# Patient Record
Sex: FEMALE | Race: WHITE | URBAN - METROPOLITAN AREA
[De-identification: names, ages, dates, MRNs, and addresses within clinical notes are randomized per-mention and may not be internally consistent; named-entity substitution may affect disease eponyms.]

---

## 2017-08-08 ENCOUNTER — OFFICE VISIT (OUTPATIENT)
Dept: FAMILY MEDICINE | Facility: CLINIC | Age: 67
End: 2017-08-08
Payer: MEDICARE

## 2017-08-08 VITALS
BODY MASS INDEX: 26.91 KG/M2 | HEIGHT: 64 IN | TEMPERATURE: 97.3 F | SYSTOLIC BLOOD PRESSURE: 116 MMHG | WEIGHT: 157.6 LBS | OXYGEN SATURATION: 99 % | DIASTOLIC BLOOD PRESSURE: 64 MMHG | HEART RATE: 72 BPM

## 2017-08-08 DIAGNOSIS — I10 HTN, GOAL BELOW 150/90: ICD-10-CM

## 2017-08-08 DIAGNOSIS — R10.13 ABDOMINAL PAIN, EPIGASTRIC: Primary | ICD-10-CM

## 2017-08-08 DIAGNOSIS — Z11.59 NEED FOR HEPATITIS C SCREENING TEST: ICD-10-CM

## 2017-08-08 DIAGNOSIS — R10.13 DYSPEPSIA: ICD-10-CM

## 2017-08-08 LAB
ALBUMIN SERPL-MCNC: 4.1 G/DL (ref 3.4–5)
ALP SERPL-CCNC: 72 U/L (ref 40–150)
ALT SERPL W P-5'-P-CCNC: 23 U/L (ref 0–50)
AMYLASE SERPL-CCNC: 39 U/L (ref 30–110)
ANION GAP SERPL CALCULATED.3IONS-SCNC: 10 MMOL/L (ref 3–14)
AST SERPL W P-5'-P-CCNC: 17 U/L (ref 0–45)
BASOPHILS # BLD AUTO: 0 10E9/L (ref 0–0.2)
BASOPHILS NFR BLD AUTO: 0.3 %
BILIRUB SERPL-MCNC: 0.4 MG/DL (ref 0.2–1.3)
BUN SERPL-MCNC: 20 MG/DL (ref 7–30)
CALCIUM SERPL-MCNC: 9.6 MG/DL (ref 8.5–10.1)
CHLORIDE SERPL-SCNC: 103 MMOL/L (ref 94–109)
CO2 SERPL-SCNC: 25 MMOL/L (ref 20–32)
CREAT SERPL-MCNC: 0.79 MG/DL (ref 0.52–1.04)
DIFFERENTIAL METHOD BLD: NORMAL
EOSINOPHIL # BLD AUTO: 0.2 10E9/L (ref 0–0.7)
EOSINOPHIL NFR BLD AUTO: 2.2 %
ERYTHROCYTE [DISTWIDTH] IN BLOOD BY AUTOMATED COUNT: 13.8 % (ref 10–15)
GFR SERPL CREATININE-BSD FRML MDRD: 72 ML/MIN/1.7M2
GLUCOSE SERPL-MCNC: 87 MG/DL (ref 70–99)
HCT VFR BLD AUTO: 39.4 % (ref 35–47)
HGB BLD-MCNC: 13.1 G/DL (ref 11.7–15.7)
LIPASE SERPL-CCNC: 244 U/L (ref 73–393)
LYMPHOCYTES # BLD AUTO: 2.5 10E9/L (ref 0.8–5.3)
LYMPHOCYTES NFR BLD AUTO: 33.3 %
MCH RBC QN AUTO: 30.5 PG (ref 26.5–33)
MCHC RBC AUTO-ENTMCNC: 33.2 G/DL (ref 31.5–36.5)
MCV RBC AUTO: 92 FL (ref 78–100)
MONOCYTES # BLD AUTO: 0.9 10E9/L (ref 0–1.3)
MONOCYTES NFR BLD AUTO: 12.4 %
NEUTROPHILS # BLD AUTO: 3.9 10E9/L (ref 1.6–8.3)
NEUTROPHILS NFR BLD AUTO: 51.8 %
PLATELET # BLD AUTO: 260 10E9/L (ref 150–450)
POTASSIUM SERPL-SCNC: 4.1 MMOL/L (ref 3.4–5.3)
PROT SERPL-MCNC: 7.8 G/DL (ref 6.8–8.8)
RBC # BLD AUTO: 4.3 10E12/L (ref 3.8–5.2)
SODIUM SERPL-SCNC: 138 MMOL/L (ref 133–144)
WBC # BLD AUTO: 7.6 10E9/L (ref 4–11)

## 2017-08-08 PROCEDURE — 85025 COMPLETE CBC W/AUTO DIFF WBC: CPT | Performed by: PREVENTIVE MEDICINE

## 2017-08-08 PROCEDURE — 83690 ASSAY OF LIPASE: CPT | Performed by: PREVENTIVE MEDICINE

## 2017-08-08 PROCEDURE — 82150 ASSAY OF AMYLASE: CPT | Performed by: PREVENTIVE MEDICINE

## 2017-08-08 PROCEDURE — 80053 COMPREHEN METABOLIC PANEL: CPT | Performed by: PREVENTIVE MEDICINE

## 2017-08-08 PROCEDURE — 36415 COLL VENOUS BLD VENIPUNCTURE: CPT | Performed by: PREVENTIVE MEDICINE

## 2017-08-08 PROCEDURE — 99214 OFFICE O/P EST MOD 30 MIN: CPT | Performed by: PREVENTIVE MEDICINE

## 2017-08-08 RX ORDER — LISINOPRIL 5 MG/1
TABLET ORAL
COMMUNITY
Start: 2017-07-27

## 2017-08-08 RX ORDER — LINACLOTIDE 145 UG/1
CAPSULE, GELATIN COATED ORAL
COMMUNITY
Start: 2017-07-27

## 2017-08-08 RX ORDER — ESTRADIOL 10 UG/1
TABLET VAGINAL
COMMUNITY
Start: 2017-07-27

## 2017-08-08 NOTE — PATIENT INSTRUCTIONS
Based on your medical history and these are the current health maintenance or preventive care services that you are due for (some may have been done at this visit)  Health Maintenance Due   Topic Date Due     TETANUS IMMUNIZATION (SYSTEM ASSIGNED)  07/28/1966     HEPATITIS C SCREENING  07/28/1966     LIPID SCREEN Q5 YR FEMALE (SYSTEM ASSIGNED)  07/28/1993     MAMMO SCREEN Q2 YR (SYSTEM ASSIGNED)  07/28/1998     COLON CANCER SCREEN (SYSTEM ASSIGNED)  07/28/1998     ADVANCE DIRECTIVE PLANNING Q5 YRS  07/28/2003     FALL RISK ASSESSMENT  07/28/2013     DEXA SCAN SCREENING (SYSTEM ASSIGNED)  07/28/2013     PNEUMOCOCCAL (1 of 2 - PCV13) 07/28/2013         At [facility], we strive to deliver an exceptional experience to you, every time we see you.    If you receive a survey in the mail, please send us back your thoughts. We really do value your feedback.    Your care team's suggested websites for health information:  Www.MiserWare.Ganjiwang : Up to date and easily searchable information on multiple topics.  Www.medlineplus.gov : medication info, interactive tutorials, watch real surgeries online  Www.familydoctor.org : good info from the Academy of Family Physicians  Www.cdc.gov : public health info, travel advisories, epidemics (H1N1)  Www.aap.org : children's health info, normal development, vaccinations  Www.health.Counts include 234 beds at the [facility].[place].us : [place] dept of health, public health issues in [place], N1N1    How to contact your care team:   Team Karyn/Jarad [phone]         Pharmacy [phone]    Dr. Durbin, Vivien Flores PA-C, Dr. Calderon, Lakshmi Oscar APRN CNP, Estefania Joy PA-C, Dr. Barrera, and Sylvia Carter APRN CNP    Team RNs: Emely & Diana      Clinic hours  M-Th 7 am-7 pm   Fri 7 am-5 pm.   Urgent care M-F 11 am-9 pm,   Sat/Sun 9 am-5 pm.  Pharmacy M-Th 8 am-8 pm Fri 8 am-6 pm  Sat/Sun 9 am-5 pm.     All password changes, disabled accounts, or ID changes in MyChart/MyHealth will be done by our  Access Services Department.    If you need help with your account or password, call: [phone]. Clinic staff no longer has the ability to change passwords.

## 2017-08-08 NOTE — Clinical Note
Mammogram normal with PCP in [place] 11/2016.  Colonoscopy normal 9 years ago at [facility]. Jessica Calderon MD MPH

## 2017-08-08 NOTE — MR AVS SNAPSHOT
After Visit Summary   8/8/2017    Maude Santiago    MRN: [id number]           Patient Information     Date Of Birth          1950        Visit Information        Provider Department      8/8/2017 1:20 PM Jessica Calderon MD [facility]        Today's Diagnoses     Abdominal pain, epigastric    -  1    Dyspepsia          Care Instructions    Based on your medical history and these are the current health maintenance or preventive care services that you are due for (some may have been done at this visit)  Health Maintenance Due   Topic Date Due     TETANUS IMMUNIZATION (SYSTEM ASSIGNED)  07/28/1966     HEPATITIS C SCREENING  07/28/1966     LIPID SCREEN Q5 YR FEMALE (SYSTEM ASSIGNED)  07/28/1993     MAMMO SCREEN Q2 YR (SYSTEM ASSIGNED)  07/28/1998     COLON CANCER SCREEN (SYSTEM ASSIGNED)  07/28/1998     ADVANCE DIRECTIVE PLANNING Q5 YRS  07/28/2003     FALL RISK ASSESSMENT  07/28/2013     DEXA SCAN SCREENING (SYSTEM ASSIGNED)  07/28/2013     PNEUMOCOCCAL (1 of 2 - PCV13) 07/28/2013         At [facility], we strive to deliver an exceptional experience to you, every time we see you.    If you receive a survey in the mail, please send us back your thoughts. We really do value your feedback.    Your care team's suggested websites for health information:  Www.Loxley.org : Up to date and easily searchable information on multiple topics.  Www.medlineplus.gov : medication info, interactive tutorials, watch real surgeries online  Www.familydoctor.org : good info from the Academy of Family Physicians  Www.cdc.gov : public health info, travel advisories, epidemics (H1N1)  Www.aap.org : children's health info, normal development, vaccinations  Www.health.state.[place].us : [place] dept of health, public health issues in [place], N1N1    How to contact your care team:   Team Karyn/Spirit [phone]         Pharmacy [phone]    Dr. Durbin, Vivien Flores PA-C, Dr. Calderon,  Lakshmi MURRELL CNP, Estefania Joy PA-C, Dr. Barrera, and HANDY Guajardo CNP    Team RNs: Emely & Diana      Clinic hours  M-Th 7 am-7 pm   Fri 7 am-5 pm.   Urgent care M-F 11 am-9 pm,   Sat/Sun 9 am-5 pm.  Pharmacy M-Th 8 am-8 pm Fri 8 am-6 pm  Sat/Sun 9 am-5 pm.     All password changes, disabled accounts, or ID changes in Reading Room/MyHealth will be done by our Access Services Department.    If you need help with your account or password, call: [phone]. Clinic staff no longer has the ability to change passwords.             Follow-ups after your visit        Your next 10 appointments already scheduled     Aug 14, 2017  2:00 PM CDT   Return Visit with HANDY Albright CNP   [facility]    [address] [place] [id number] [phone]              Future tests that were ordered for you today     Open Future Orders        Priority Expected Expires Ordered    US Abdomen Limited Routine  8/8/2018 8/8/2017    H Pylori antigen, stool Routine  9/7/2017 8/8/2017            Who to contact     If you have questions or need follow up information about today's clinic visit or your schedule please contact [facility] directly at [phone].  Normal or non-critical lab and imaging results will be communicated to you by MyChart, letter or phone within 4 business days after the clinic has received the results. If you do not hear from us within 7 days, please contact the clinic through MyChart or phone. If you have a critical or abnormal lab result, we will notify you by phone as soon as possible.  Submit refill requests through Reading Room or call your pharmacy and they will forward the refill request to us. Please allow 3 business days for your refill to be completed.          Additional Information About Your Visit        Silverlink Communicationshart Information     Reading Room lets you send messages to your doctor, view your test results, renew  "your prescriptions, schedule appointments and more. To sign up, go to [email or web address] . Click on \"Log in\" on the left side of the screen, which will take you to the Welcome page. Then click on \"Sign up Now\" on the right side of the page.     You will be asked to enter the access code listed below, as well as some personal information. Please follow the directions to create your username and password.     Your access code is: [email or web address]  Expires: 2017  1:56 PM     Your access code will  in 90 days. If you need help or a new code, please call your [facility] clinic or [phone].        Care EveryWhere ID     This is your Care EveryWhere ID. This could be used by other organizations to access your [facility] medical records  [id number]        Your Vitals Were     Pulse Temperature Height Pulse Oximetry BMI (Body Mass Index)       72 97.3  F (36.3  C) (Oral) 5' 3.98\" (1.625 m) 99% 27.07 kg/m2        Blood Pressure from Last 3 Encounters:   17 116/64   14 138/64    Weight from Last 3 Encounters:   17 157 lb 9.6 oz (71.5 kg)   14 157 lb (71.2 kg)              We Performed the Following     Amylase     CBC with platelets differential     Comprehensive metabolic panel     Lipase          Today's Medication Changes          These changes are accurate as of: 17  1:56 PM.  If you have any questions, ask your nurse or doctor.               Start taking these medicines.        Dose/Directions    omeprazole 20 MG CR capsule   Commonly known as:  priLOSEC   Used for:  Abdominal pain, epigastric, Dyspepsia   Started by:  Jessica Calderon MD        Dose:  20 mg   Take 1 capsule (20 mg) by mouth daily   Quantity:  30 capsule   Refills:  1       ranitidine 300 MG tablet   Commonly known as:  ZANTAC   Used for:  Dyspepsia, Abdominal pain, epigastric   Started by:  Jessica Calderon MD        Dose:  300 mg   Take 1 tablet (300 mg) by mouth At Bedtime   Quantity:  30 tablet   Refills:  1    "         Where to get your medicines      These [place] [facility] [place] [address]  [address], [place] [address]     Phone:  [phone]     omeprazole 20 MG CR capsule    ranitidine 300 MG tablet                Primary Care Provider    [facility]       No address on file        Equal Access to Services     BLAKE BOSS : Hadii aad ku hadasho Soomaali, waaxda luqadaha, qaybta kaalmada adeegyada, waxay idiin hayaan adeeg kharash laamador . So [facility] [phone].    ATENCIÓN: Si habla español, tiene a juarez disposición servicios gratuitos de asistencia lingüística. Llame al [phone].    We comply with applicable federal civil rights laws and [place] laws. We do not discriminate on the basis of race, color, national origin, age, disability sex, sexual orientation or gender identity.            Thank you!     Thank you for choosing [facility]  for your care. Our goal is always to provide you with excellent care. Hearing back from our patients is one way we can continue to improve our services. Please take a few minutes to complete the written survey that you may receive in the mail after your visit with us. Thank you!             Your Updated Medication List - Protect others around you: Learn how to safely use, store and throw away your medicines at www.disposemymeds.org.          This list is accurate as of: 8/8/17  1:56 PM.  Always use your most recent med list.                   Brand Name Dispense Instructions for use Diagnosis    CRESTOR PO      Take 20 mg by mouth daily        ESCITALOPRAM OXALATE PO      Take 10 mg by mouth daily        ESTAZOLAM PO      Take 2 mg by mouth At Bedtime        HYDROCHLOROTHIAZIDE PO      Take 25 mg by mouth daily        LINZESS 145 MCG capsule   Generic drug:  linaclotide           lisinopril 5 MG tablet    PRINIVIL/ZESTRIL          metroNIDAZOLE 0.75 % cream     METROCREAM     Apply 0.75 applicators topically 2 times daily        omeprazole 20 MG CR capsule    priLOSEC    30 capsule    Take 1 capsule (20 mg) by mouth daily    Abdominal pain, epigastric, Dyspepsia       RAMIPRIL PO      Take 5 mg by mouth daily        ranitidine 300 MG tablet    ZANTAC    30 tablet    Take 1 tablet (300 mg) by mouth At Bedtime    Dyspepsia, Abdominal pain, epigastric       YUVAFEM 10 MCG Tabs vaginal tablet   Generic drug:  estradiol

## 2017-08-08 NOTE — NURSING NOTE
"Chief Complaint   Patient presents with     Abdominal Pain       Initial /64 (BP Location: Right arm, Patient Position: Sitting, Cuff Size: Adult Regular)  Pulse 72  Temp 97.3  F (36.3  C) (Oral)  Ht 5' 3.98\" (1.625 m)  Wt 157 lb 9.6 oz (71.5 kg)  SpO2 99%  BMI 27.07 kg/m2 Estimated body mass index is 27.07 kg/(m^2) as calculated from the following:    Height as of this encounter: 5' 3.98\" (1.625 m).    Weight as of this encounter: 157 lb 9.6 oz (71.5 kg).  Medication Reconciliation: complete     Panel Management: Mammo completed in 11/2016 at [facility] [place], results were normal. Will send to abstraction team.      "

## 2017-08-08 NOTE — PROGRESS NOTES
SUBJECTIVE:                                                    Maude Santiago is a 67 year old female who presents to clinic today for the following health issues:      ABDOMINAL   PAIN     Onset: Three weeks     Description:   Character: Stabbing in the rib area, Burning and Cramping  Location: epigastric region  Radiation: Neck     Intensity: moderate    Progression of Symptoms:  improving    Accompanying Signs & Symptoms:  Fever/Chills?: YES- fever and chills   Gas/Bloating: no   Nausea: no   Vomitting: no   Diarrhea?: no   Constipation:no   Dysuria or Hematuria: no   No belching or burping  No rectal bleeding or melena  History of chronic constipation for which she takes Miralax almost daily, has also been prescribed Linzess    History:   Trauma: no   Previous similar pain: no    Previous tests done: none    Precipitating factors:   Does the pain change with:     Food: no      BM: no     Urination: no     Alleviating factors:      Therapies Tried and outcome: Tylenol, ibuprofen, zantac    LMP:  not applicable       Normally dandre in [place], has PCP there. Has been helping her step son with packing, asim has been lifting some boxes. No trauma or falls, no rash, no bruising.   Pain unchanged in severity over the last few weeks  Felt feverish   No dysuria, no hematuria  Pain is more with activity  She feels it is also under the ribs, worse with coughing, sneezing and laughing.     Hypertension Follow-up      Outpatient blood pressures are being checked at home.  Results are less than 140/90.    Low Salt Diet: low salt          Problem list and histories reviewed & adjusted, as indicated.  Additional history: as documented    Patient Active Problem List   Diagnosis     CARDIOVASCULAR SCREENING; LDL GOAL LESS THAN 160     HTN, goal below 150/90     History reviewed. No pertinent surgical history.    Social History   Substance Use Topics     Smoking status: Never Smoker     Smokeless tobacco: Never Used     Alcohol use  "No     History reviewed. No pertinent family history.      Current Outpatient Prescriptions   Medication Sig Dispense Refill     LINZESS 145 MCG capsule        lisinopril (PRINIVIL/ZESTRIL) 5 MG tablet        YUVAFEM 10 MCG TABS vaginal tablet        ranitidine (ZANTAC) 300 MG tablet Take 1 tablet (300 mg) by mouth At Bedtime 30 tablet 1     omeprazole (PRILOSEC) 20 MG CR capsule Take 1 capsule (20 mg) by mouth daily 30 capsule 1     Rosuvastatin Calcium (CRESTOR PO) Take 20 mg by mouth daily       RAMIPRIL PO Take 5 mg by mouth daily       metroNIDAZOLE (METROCREAM) 0.75 % cream Apply 0.75 applicators topically 2 times daily       ESCITALOPRAM OXALATE PO Take 10 mg by mouth daily       HYDROCHLOROTHIAZIDE PO Take 25 mg by mouth daily       ESTAZOLAM PO Take 2 mg by mouth At Bedtime       No Known Allergies  BP Readings from Last 3 Encounters:   08/08/17 116/64   09/03/14 138/64    Wt Readings from Last 3 Encounters:   08/08/17 157 lb 9.6 oz (71.5 kg)   09/03/14 157 lb (71.2 kg)                Reviewed and updated as needed this visit by clinical staff       Reviewed and updated as needed this visit by Provider         ROS:  Constitutional, neuro, ENT, endocrine, pulmonary, cardiac, gastrointestinal, genitourinary, musculoskeletal, integument and psychiatric systems are negative, except as otherwise noted.      OBJECTIVE:                                                    /64 (BP Location: Right arm, Patient Position: Sitting, Cuff Size: Adult Regular)  Pulse 72  Temp 97.3  F (36.3  C) (Oral)  Ht 5' 3.98\" (1.625 m)  Wt 157 lb 9.6 oz (71.5 kg)  SpO2 99%  BMI 27.07 kg/m2  Body mass index is 27.07 kg/(m^2).  GENERAL APPEARANCE: healthy, alert and no distress  EYES: Eyes grossly normal to inspection and conjunctivae and sclerae normal  NECK: no adenopathy and no asymmetry, masses, or scars  RESP: lungs clear to auscultation - no rales, rhonchi or wheezes  CV: regular rates and rhythm, normal S1 S2, no S3 or " S4 and no murmur, click or rub  ABDOMEN: non distended, no rebound or guarding, no masses, mild tenderness in RUQ and epigastric region   MS: extremities normal- no gross deformities noted  SKIN: no suspicious lesions or rashes  NEURO: Normal strength and tone, mentation intact and speech normal  PSYCH: mentation appears normal and affect normal/bright    Diagnostic test results:  Diagnostic Test Results:  Results for orders placed or performed in visit on 08/08/17 (from the past 24 hour(s))   CBC with platelets differential   Result Value Ref Range    WBC 7.6 4.0 - 11.0 10e9/L    RBC Count 4.30 3.8 - 5.2 10e12/L    Hemoglobin 13.1 11.7 - 15.7 g/dL    Hematocrit 39.4 35.0 - 47.0 %    MCV 92 78 - 100 fl    MCH 30.5 26.5 - 33.0 pg    MCHC 33.2 31.5 - 36.5 g/dL    RDW 13.8 10.0 - 15.0 %    Platelet Count 260 150 - 450 10e9/L    Diff Method Automated Method     % Neutrophils 51.8 %    % Lymphocytes 33.3 %    % Monocytes 12.4 %    % Eosinophils 2.2 %    % Basophils 0.3 %    Absolute Neutrophil 3.9 1.6 - 8.3 10e9/L    Absolute Lymphocytes 2.5 0.8 - 5.3 10e9/L    Absolute Monocytes 0.9 0.0 - 1.3 10e9/L    Absolute Eosinophils 0.2 0.0 - 0.7 10e9/L    Absolute Basophils 0.0 0.0 - 0.2 10e9/L          ASSESSMENT/PLAN:                                                    1. Abdominal pain, epigastric  -Await labs  -Can wait to schedule ultrasound 5-7 days if pain is not increasing   - ranitidine (ZANTAC) 300 MG tablet; Take 1 tablet (300 mg) by mouth At Bedtime  Dispense: 30 tablet; Refill: 1  - omeprazole (PRILOSEC) 20 MG CR capsule; Take 1 capsule (20 mg) by mouth daily  Dispense: 30 capsule; Refill: 1  - Amylase  - Lipase  - Comprehensive metabolic panel  - CBC with platelets differential  - H Pylori antigen, stool; Future  - US Abdomen Limited; Future  --ER precautions reviewed in detail. If increased abdominal pain, fever over 101 F, emesis, rectal bleeding, melena, then needs to be seen in ER, patient expressed  comprehension of this.       2. Dyspepsia  - ranitidine (ZANTAC) 300 MG tablet; Take 1 tablet (300 mg) by mouth At Bedtime  Dispense: 30 tablet; Refill: 1  - omeprazole (PRILOSEC) 20 MG CR capsule; Take 1 capsule (20 mg) by mouth daily  Dispense: 30 capsule; Refill: 1  - H Pylori antigen, stool; Future    3. Need for hepatitis C screening test  - **Hepatitis C Screen Reflex to RNA FUTURE anytime; Future    4. HTN, goal below 150/90  -Readings are at goal  -Follow up with PCP as scheduled in [place]     I ended our visit today by discussing the patient's diagnoses and recommended treatment. Please refer to today's diagnoses and orders for further details. I briefly discussed the pathophysiology of these conditions and outlined their expected course. I discussed the warning symptoms and signs that indicate an atypical course that would need urgent or emergent care. I also discussed self care strategies for symptom relief.  Patient voiced complete understanding of plan of care and was in full agreement to proceed. After visit summary discussed and handed to patient.    Common side effects of medications prescribed at this visit were discussed with the patient. Severe side effects, including current applicable black box warnings, were discussed.     Follow up with Provider - Will contact with labs when available      Jessica Calderon MD MPH    [facility]

## 2017-08-09 ENCOUNTER — RADIANT APPOINTMENT (OUTPATIENT)
Dept: ULTRASOUND IMAGING | Facility: CLINIC | Age: 67
End: 2017-08-09
Payer: MEDICARE

## 2017-08-09 DIAGNOSIS — R10.13 ABDOMINAL PAIN, EPIGASTRIC: ICD-10-CM

## 2017-08-09 PROCEDURE — 76705 ECHO EXAM OF ABDOMEN: CPT

## 2017-08-09 NOTE — LETTER
[address] [address]  [phone]                                                                                                             August 9, 2017      Maude Santiago  [facility] [facility] [address] [facility]  [place] [address]            Dear Maude Santiago,    Ultrasound of the upper part of the abdomen did not show any abnormalities. No lesions in the liver or gallbladder seen. Plan of care and follow up as discussed in clinic. Please let me know if you have any questions and thank you for choosing [facility].    Regards,    Jessica Calderon MD MPH/ak        Results for orders placed or performed in visit on 08/09/17   US Abdomen Limited    Narrative    Right upper quadrant ultrasound    Comparisons: None    HISTORY:    Epigastric pain    FINDINGS:    The liver measures a craniocaudal dimension of 15.2 cm.  No focal liver lesion. Liver echogenicity is normal.  The gallbladder  is normal. There is no gallbladder wall thickening or pericholecystic  fluid. No sonographic Hutson's sign was elicited.  The diameter of the  common bile duct measures 5mm. The pancreas is partially obscured but  otherwise appears unremarkable. The aorta and IVC are visualized. The  right kidney measures 9.8 cm long. No solid renal mass, stone or  hydronephrosis.      Impression    IMPRESSION:    Normal right upper quadrant ultrasound.    SANDI BRYSON MD

## 2017-08-09 NOTE — PROGRESS NOTES
Please CALL patient: (home address is in [place] and she is currently traveling)    Dear Maude Santiago,    Labs did not show any abnormalities.  Basic blood count did not show anemia or infection in the blood. Electrolytes, glucose, pancreas, kidney and liver function tests are normal.     Regards,    Jessica Calderon MD MPH

## 2017-08-09 NOTE — PROGRESS NOTES
Please send a letter:    Dear Maude Santiago,    Ultrasound of the upper part of the abdomen did not show any abnormalities. No lesions in the liver or gallbladder seen. Plan of care and follow up as discussed in clinic. Please let me know if you have any questions and thank you for choosing [facility].    Regards,    Jessica Calderon MD MPH

## 2017-08-10 DIAGNOSIS — R10.13 ABDOMINAL PAIN, EPIGASTRIC: ICD-10-CM

## 2017-08-10 DIAGNOSIS — R10.13 DYSPEPSIA: ICD-10-CM

## 2017-08-10 PROCEDURE — 87338 HPYLORI STOOL AG IA: CPT | Performed by: PREVENTIVE MEDICINE

## 2017-08-10 NOTE — LETTER
Maude Santiago  [address]  [place] [address]        August 15, 2017          Dear Ms.Santiago,    We are writing to inform you of your test results.    Stool test did not show infection with bacteria H Pylori. Plan of care and follow up as discussed in clinic. Please let me know if you have any questions and thank you for choosing [facility].     Resulted Orders   H Pylori antigen, stool   Result Value Ref Range    Specimen Description Feces     H Pylori Antigen       Negative for Helicobacter pylori antigen by enzyme immunoassay. A negative   result indicates the absence of H. pylori antigen or that the level of antigen   is below the level of detection.      Micro Report Status FINAL 08/11/2017        If you have any questions or concerns, please call the clinic at the number listed above.       Sincerely,    Dr. Calderon

## 2017-08-11 LAB
H PYLORI AG STL QL IA: NORMAL
MICRO REPORT STATUS: NORMAL
SPECIMEN SOURCE: NORMAL

## 2017-08-15 ENCOUNTER — RADIANT APPOINTMENT (OUTPATIENT)
Dept: GENERAL RADIOLOGY | Facility: CLINIC | Age: 67
End: 2017-08-15
Payer: MEDICARE

## 2017-08-15 ENCOUNTER — OFFICE VISIT (OUTPATIENT)
Dept: FAMILY MEDICINE | Facility: CLINIC | Age: 67
End: 2017-08-15
Payer: MEDICARE

## 2017-08-15 VITALS
BODY MASS INDEX: 27.66 KG/M2 | WEIGHT: 161 LBS | TEMPERATURE: 97.9 F | SYSTOLIC BLOOD PRESSURE: 118 MMHG | OXYGEN SATURATION: 98 % | DIASTOLIC BLOOD PRESSURE: 60 MMHG | HEART RATE: 72 BPM

## 2017-08-15 DIAGNOSIS — I10 HTN, GOAL BELOW 150/90: Primary | ICD-10-CM

## 2017-08-15 DIAGNOSIS — R06.02 SOB (SHORTNESS OF BREATH): ICD-10-CM

## 2017-08-15 DIAGNOSIS — M79.10 MUSCLE PAIN: ICD-10-CM

## 2017-08-15 DIAGNOSIS — E78.5 HYPERLIPIDEMIA LDL GOAL <130: ICD-10-CM

## 2017-08-15 PROCEDURE — 99214 OFFICE O/P EST MOD 30 MIN: CPT | Performed by: PREVENTIVE MEDICINE

## 2017-08-15 PROCEDURE — 71020 XR CHEST 2 VW: CPT

## 2017-08-15 RX ORDER — CYCLOBENZAPRINE HCL 5 MG
5 TABLET ORAL
Qty: 30 TABLET | Refills: 0 | Status: SHIPPED | OUTPATIENT
Start: 2017-08-15

## 2017-08-15 NOTE — PROGRESS NOTES
Please send a letter:    Dear Maude Santiago,    Stool test did not show infection with bacteria H Pylori. Plan of care and follow up as discussed in clinic. Please let me know if you have any questions and thank you for choosing [facility].    Regards,    Jessica Calderon MD MPH

## 2017-08-15 NOTE — PROGRESS NOTES
SUBJECTIVE:                                                    Maude Santiago is a 67 year old female who presents to clinic today for the following health issues:      Abdominal Pain      Duration: 1 month     Description (location/character/radiation): rib cage and down the sides mid section and having shortness of breath        Associated flank pain: None    Intensity:  moderate    Accompanying signs and symptoms:        Fever/Chills: YES       Gas/Bloating: YES       Nausea/vomitting: no        Diarrhea: no        Dysuria or Hematuria: no     History (previous similar pain/trauma/previous testing): No    Precipitating or alleviating factors:       Pain worse with eating/BM/urination: Eating       Pain relieved by BM: no     Therapies tried and outcome: None Zantac     LMP:     Right nasal stuffiness, used Afrin and Sudafed. Feeling chest congested and a little short of breath. Symptoms now for 4 weeks. No fever. Feels feverish and has had chills. Is visiting from [place], staying with sister, who does have pets, has noticed increased nasal stuffiness with this, better with allergy medication     Also has had generalized muscle aching mainly waist up. Has tolerated statin without complications in the past.     Hyperlipidemia Follow-Up      Rate your low fat/cholesterol diet?: good    Taking statin?  Yes, possible muscle aches from statin    Other lipid medications/supplements?:  none    Hypertension Follow-up      Outpatient blood pressures are being checked at home.  Results are less than 140/90.    Low Salt Diet: low salt            Problem list and histories reviewed & adjusted, as indicated.  Additional history: as documented    Patient Active Problem List   Diagnosis     CARDIOVASCULAR SCREENING; LDL GOAL LESS THAN 160     HTN, goal below 150/90     Hyperlipidemia LDL goal <130     No past surgical history on file.    Social History   Substance Use Topics     Smoking status: Never Smoker     Smokeless tobacco:  Never Used     Alcohol use No     No family history on file.      Current Outpatient Prescriptions   Medication Sig Dispense Refill     cyclobenzaprine (FLEXERIL) 5 MG tablet Take 1 tablet (5 mg) by mouth nightly as needed for muscle spasms 30 tablet 0     LINZESS 145 MCG capsule        lisinopril (PRINIVIL/ZESTRIL) 5 MG tablet        YUVAFEM 10 MCG TABS vaginal tablet        ranitidine (ZANTAC) 300 MG tablet Take 1 tablet (300 mg) by mouth At Bedtime 30 tablet 1     omeprazole (PRILOSEC) 20 MG CR capsule Take 1 capsule (20 mg) by mouth daily 30 capsule 1     Rosuvastatin Calcium (CRESTOR PO) Take 20 mg by mouth daily       RAMIPRIL PO Take 5 mg by mouth daily       ESCITALOPRAM OXALATE PO Take 10 mg by mouth daily       HYDROCHLOROTHIAZIDE PO Take 25 mg by mouth daily       metroNIDAZOLE (METROCREAM) 0.75 % cream Apply 0.75 applicators topically 2 times daily       ESTAZOLAM PO Take 2 mg by mouth At Bedtime       No Known Allergies  BP Readings from Last 3 Encounters:   08/15/17 118/60   08/08/17 116/64   09/03/14 138/64    Wt Readings from Last 3 Encounters:   08/15/17 161 lb (73 kg)   08/08/17 157 lb 9.6 oz (71.5 kg)   09/03/14 157 lb (71.2 kg)                        Reviewed and updated as needed this visit by clinical staff     Reviewed and updated as needed this visit by Provider         ROS:  Constitutional, HEENT, cardiovascular, pulmonary, gi and gu systems are negative, except as otherwise noted.      OBJECTIVE:                                                    /60  Pulse 72  Temp 97.9  F (36.6  C) (Tympanic)  Wt 161 lb (73 kg)  SpO2 98%  Breastfeeding? No  BMI 27.66 kg/m2  Body mass index is 27.66 kg/(m^2).  GENERAL APPEARANCE: healthy, alert and no distress  EYES: Eyes grossly normal to inspection and conjunctivae and sclerae normal  HENT: nose and mouth without ulcers or lesions  NECK: no adenopathy, no asymmetry, masses, or scars and trachea midline and normal to palpation  RESP: lungs  clear to auscultation - no rales, rhonchi or wheezes  CV: regular rates and rhythm, normal S1 S2, no S3 or S4 and no murmur, click or rub  ABDOMEN: soft, non-tender and no rebound or guarding   MS: extremities normal- no gross deformities noted  SKIN: no suspicious lesions or rashes  NEURO: Normal strength and tone, mentation intact and speech normal  PSYCH: mentation appears normal and affect normal/bright    Diagnostic test results:  Diagnostic Test Results:  Results for orders placed or performed in visit on 08/10/17   H Pylori antigen, stool   Result Value Ref Range    Specimen Description Feces     H Pylori Antigen       Negative for Helicobacter pylori antigen by enzyme immunoassay. A negative   result indicates the absence of H. pylori antigen or that the level of antigen   is below the level of detection.      Micro Report Status FINAL 08/11/2017           CHEST TWO VIEWS   8/15/2017 3:49 PM     HISTORY:  Shortness of breath     COMPARISON:  None.     FINDINGS:  The heart size is normal. No mediastinal pathology is seen.  The lungs are clear. The pulmonary vasculature is normal. No  pneumothorax or pleural effusion is seen.          IMPRESSION:  Unremarkable chest.     ANNY PENDLETON MD    Right upper quadrant ultrasound     Comparisons: None     HISTORY:    Epigastric pain     FINDINGS:    The liver measures a craniocaudal dimension of 15.2 cm.  No focal liver lesion. Liver echogenicity is normal.  The gallbladder  is normal. There is no gallbladder wall thickening or pericholecystic  fluid. No sonographic Hutson's sign was elicited.  The diameter of the  common bile duct measures 5mm. The pancreas is partially obscured but  otherwise appears unremarkable. The aorta and IVC are visualized. The  right kidney measures 9.8 cm long. No solid renal mass, stone or  hydronephrosis.         IMPRESSION:    Normal right upper quadrant ultrasound.     SANDI BRYSON MD      ASSESSMENT/PLAN:                                                     1. HTN, goal below 150/90  -Recheck BP is normal  -Follow up with PCP in [place]  -Avoid over the counter decongestants as these can raise blood pressure     2. Hyperlipidemia LDL goal <130  -On 20 mg of Crestor  -Hold for 5 days and see if muscle aches improve     3. SOB (shortness of breath)  -Chest X ray is negative for infections   - XR Chest 2 Views; Future    4. Muscle pain  -CBC, CMP, Amylase and Lipase were normal  -Continue Zantac and Omeprazole as needed for dyspepsia  -Sedation side effect of muscle relaxer reviewed   - cyclobenzaprine (FLEXERIL) 5 MG tablet; Take 1 tablet (5 mg) by mouth nightly as needed for muscle spasms  Dispense: 30 tablet; Refill: 0    I ended our visit today by discussing the patient's diagnoses and recommended treatment. Please refer to today's diagnoses and orders for further details. I briefly discussed the pathophysiology of these conditions and outlined their expected course. I discussed the warning symptoms and signs that indicate an atypical course that would need urgent or emergent care. I also discussed self care strategies for symptom relief.  Patient voiced complete understanding of plan of care and was in full agreement to proceed. After visit summary discussed and handed to patient.    Common side effects of medications prescribed at this visit were discussed with the patient. Severe side effects, including current applicable black box warnings, were discussed.         Follow up with Provider - As needed      Jessica Calderon MD [facility]

## 2017-08-15 NOTE — PROGRESS NOTES
Please CALL patient: (lives in [place], visiting [place])    Dear Maude Santiago,    Chest X ray did not show any acute infections, pneumonias or fluid in the lungs.     Regards,    Jessica Calderon MD MPH

## 2017-08-15 NOTE — MR AVS SNAPSHOT
After Visit Summary   8/15/2017    Maude Santiago    MRN: [id number]           Patient Information     Date Of Birth          1950        Visit Information        Provider Department      8/15/2017 2:40 PM Jessica Calderon MD [facility]        Today's Diagnoses     HTN, goal below 150/90    -  1    Hyperlipidemia LDL goal <130        SOB (shortness of breath)        Muscle pain          Care Instructions    Based on your medical history and these are the current health maintenance or preventive care services that you are due for (some may have been done at this visit)  Health Maintenance Due   Topic Date Due     TETANUS IMMUNIZATION (SYSTEM ASSIGNED)  07/28/1966     HEPATITIS C SCREENING  07/28/1966     LIPID SCREEN Q5 YR FEMALE (SYSTEM ASSIGNED)  07/28/1993     ADVANCE DIRECTIVE PLANNING Q5 YRS  07/28/2003     DEXA SCAN SCREENING (SYSTEM ASSIGNED)  07/28/2013     PNEUMOCOCCAL (1 of 2 - PCV13) 07/28/2013         At [facility], we strive to deliver an exceptional experience to you, every time we see you.    If you receive a survey in the mail, please send us back your thoughts. We really do value your feedback.    Your care team's suggested websites for health information:  Www.[employer].org : Up to date and easily searchable information on multiple topics.  Www.medlineplus.gov : medication info, interactive tutorials, watch real surgeries online  Www.familydoctor.org : good info from the Academy of Family Physicians  Www.cdc.gov : public health info, travel advisories, epidemics (H1N1)  Www.aap.org : children's health info, normal development, vaccinations  Www.health.state.[place] : [place] dept of health, public health issues in [place], N1N1    How to contact your care team:   Team Karyn/Spirit [phone]         Pharmacy [phone]    Dr. Durbin, Vivien Flores PA-C, Lakshmi Hernández APRN CNP, Estefania Joy PA-C, Dr. Barrera, and Sylvia Carter,  "APRN CNP    Team RNs: Emely Sheffield Diana      Clinic hours  M-Th 7 am-7 pm   Fri 7 am-5 pm.   Urgent care M-F 11 am-9 pm,   Sat/Sun 9 am-5 pm.  Pharmacy M-Th 8 am-8 pm Fri 8 am-6 pm  Sat/Sun 9 am-5 pm.     All password changes, disabled accounts, or ID changes in Favbuy/MyHealth will be done by our Access Services Department.    If you need help with your account or password, call: [phone]. Clinic staff no longer has the ability to change passwords.             Follow-ups after your visit        Future tests that were ordered for you today     Open Future Orders        Priority Expected Expires Ordered    XR Chest 2 Views Routine 8/15/2017 8/15/2018 8/15/2017            Who to contact     If you have questions or need follow up information about today's clinic visit or your schedule please contact [facility] directly at [phone].  Normal or non-critical lab and imaging results will be communicated to you by Mydeohart, letter or phone within 4 business days after the clinic has received the results. If you do not hear from us within 7 days, please contact the clinic through Mydeohart or phone. If you have a critical or abnormal lab result, we will notify you by phone as soon as possible.  Submit refill requests through Favbuy or call your pharmacy and they will forward the refill request to us. Please allow 3 business days for your refill to be completed.          Additional Information About Your Visit        Favbuy Information     Favbuy lets you send messages to your doctor, view your test results, renew your prescriptions, schedule appointments and more. To sign up, go to [email or web address] . Click on \"Log in\" on the left side of the screen, which will take you to the Welcome page. Then click on \"Sign up Now\" on the right side of the page.     You will be asked to enter the access code listed below, as well as some personal information. Please follow the directions to create " your username and password.     Your access code is: [email or web address]  Expires: 2017  1:56 PM     Your access code will  in 90 days. If you need help or a new code, please call your [facility] clinic or [phone].        Care EveryWhere ID     This is your Care EveryWhere ID. This could be used by other organizations to access your [facility] medical records  [id number]        Your Vitals Were     Pulse Temperature Pulse Oximetry Breastfeeding? BMI (Body Mass Index)       72 97.9  F (36.6  C) (Tympanic) 98% No 27.66 kg/m2        Blood Pressure from Last 3 Encounters:   08/15/17 118/60   17 116/64   14 138/64    Weight from Last 3 Encounters:   08/15/17 161 lb (73 kg)   17 157 lb 9.6 oz (71.5 kg)   14 157 lb (71.2 kg)                 Today's Medication Changes          These changes are accurate as of: 8/15/17  3:38 PM.  If you have any questions, ask your nurse or doctor.               Start taking these medicines.        Dose/Directions    cyclobenzaprine 5 MG tablet   Commonly known as:  FLEXERIL   Used for:  Muscle pain   Started by:  Jessica Calderon MD        Dose:  5 mg   Take 1 tablet (5 mg) by mouth nightly as needed for muscle spasms   Quantity:  30 tablet   Refills:  0            Where to get your medicines      These medications were sent to [facility] - [place], [place] - [address]  [address], [facility] [address]     Phone:  [phone]     cyclobenzaprine 5 MG tablet                Primary Care Provider    [facility] [place]       No address on file        Equal Access to Services     BLAKE BOSS AH: Jovana Ramirez, wacandaceda lubuffyadaha, qaybta kaalmada kierra, mitchell watkins. So [facility] [phone].    ATENCIÓN: Si habla español, tiene a juarez disposición servicios gratuitos de asistencia lingüística. Llame al [phone].    We comply with applicable federal civil rights laws and  [place] laws. We do not discriminate on the basis of race, color, national origin, age, disability sex, sexual orientation or gender identity.            Thank you!     Thank you for choosing [facility]  for your care. Our goal is always to provide you with excellent care. Hearing back from our patients is one way we can continue to improve our services. Please take a few minutes to complete the written survey that you may receive in the mail after your visit with us. Thank you!             Your Updated Medication List - Protect others around you: Learn how to safely use, store and throw away your medicines at www.disposemymeds.org.          This list is accurate as of: 8/15/17  3:38 PM.  Always use your most recent med list.                   Brand Name Dispense Instructions for use Diagnosis    CRESTOR PO      Take 20 mg by mouth daily        cyclobenzaprine 5 MG tablet    FLEXERIL    30 tablet    Take 1 tablet (5 mg) by mouth nightly as needed for muscle spasms    Muscle pain       ESCITALOPRAM OXALATE PO      Take 10 mg by mouth daily        ESTAZOLAM PO      Take 2 mg by mouth At Bedtime        HYDROCHLOROTHIAZIDE PO      Take 25 mg by mouth daily        LINZESS 145 MCG capsule   Generic drug:  linaclotide           lisinopril 5 MG tablet    PRINIVIL/ZESTRIL          metroNIDAZOLE 0.75 % cream    METROCREAM     Apply 0.75 applicators topically 2 times daily        omeprazole 20 MG CR capsule    priLOSEC    30 capsule    Take 1 capsule (20 mg) by mouth daily    Abdominal pain, epigastric, Dyspepsia       RAMIPRIL PO      Take 5 mg by mouth daily        ranitidine 300 MG tablet    ZANTAC    30 tablet    Take 1 tablet (300 mg) by mouth At Bedtime    Dyspepsia, Abdominal pain, epigastric       YUVAFEM 10 MCG Tabs vaginal tablet   Generic drug:  estradiol

## 2017-08-16 ENCOUNTER — TELEPHONE (OUTPATIENT)
Dept: FAMILY MEDICINE | Facility: CLINIC | Age: 67
End: 2017-08-16

## 2017-08-16 NOTE — LETTER
August 21, 2017      Maude Santiago  [address]  [place] [address]            Dear Maude Santiago,     Chest X ray did not show any acute infections, pneumonias or fluid in the lungs.     Regards,     Jessica Calderon MD MPH/ak        Results for orders placed or performed in visit on 08/15/17   XR Chest 2 Views    Narrative    CHEST TWO VIEWS   8/15/2017 3:49 PM    HISTORY:  Shortness of breath    COMPARISON:  None.    FINDINGS:  The heart size is normal. No mediastinal pathology is seen.  The lungs are clear. The pulmonary vasculature is normal. No  pneumothorax or pleural effusion is seen.       Impression    IMPRESSION:  Unremarkable chest.    ANNY PENDLETON MD

## 2017-08-16 NOTE — TELEPHONE ENCOUNTER
Called again, given a busy signal- not even able to enter long distance code. Wrong number?disconnected?  Will try again later, if still getting same result, will ask permission from provider to mail letter.  Maura Ramirez MA

## 2017-08-16 NOTE — TELEPHONE ENCOUNTER
Called number listed in demographic, busy signal. Attempted a few times, still getting busy signal.   Will try again later.    Maura Ramirez MA

## 2017-08-17 NOTE — TELEPHONE ENCOUNTER
Unable to get ahold of pt, getting busy signal. Number on file needs to be updated.   Multiple attempts to get a hold of pt, routing to provider to advise.  Nithya Zafar MA

## 2018-02-12 ENCOUNTER — TELEPHONE (OUTPATIENT)
Dept: FAMILY MEDICINE | Facility: CLINIC | Age: 68
End: 2018-02-12

## 2018-02-12 NOTE — LETTER
February 12, 2018        Maude Santiago  [address] [place] [address]        Dear Maude Santiago,     At [facility] we care about your health and are committed to providing quality patient care. Which includes staying current on preventive cancer screenings.  You can increase your chances of finding and treating cancers through regular screenings.      Our records indicate you may be due for the following preventive screening(s):    Colonoscopy  Colonoscopy is recommended every ten years for everyone age 50 and older. Please take a moment to read over the enclosed information packet about colon cancer screening. We strongly urge our patient's to consider having a colonoscopy done, which is the best screening test available and only needs to be done every 10 years if normal. If you are unwilling or unable to have a colonoscopy then we recommend the annual stool testing for blood. This test is called a FIT test and it looks for blood in the stool.     To schedule an appointment or discuss this screening further, you may contact us by phone at the [facility] at [phone] or online through the patient portal/AIM @ [email or web address]    If you have had any of the screenings listed above at another facility, please call us so that we may update your chart.      Your partners in health,          Quality Committee at [facility]

## 2018-02-12 NOTE — TELEPHONE ENCOUNTER
Panel Management Review      BP Readings from Last 1 Encounters:   08/15/17 118/60    , No results found for: A1C, 8/16/2017  Last Office Visit with this department: 8/16/2017    Fail List measure: Colon cancer screening      Patient is due/failing the following:   COLONOSCOPY    Action needed:   Schedule colonoscopy    Type of outreach:    Sent letter.    Questions for provider review:    None                                                                                                                                    Ajith Arciniega      Chart routed to NA .

## 2018-05-29 ENCOUNTER — TELEPHONE (OUTPATIENT)
Dept: FAMILY MEDICINE | Facility: CLINIC | Age: 68
End: 2018-05-29

## 2018-05-29 NOTE — TELEPHONE ENCOUNTER
Panel Management Review      BP Readings from Last 1 Encounters:   08/15/17 118/60      Last Office Visit with this department: 2/12/2018    Fail List measure:       Patient is due/failing the following:   COLONOSCOPY    Action needed:   Patient needs to do Colonoscopy.    Type of outreach:    Sent letter.    Questions for provider review:    None                                                                                                                                    Pam Priest MA

## 2018-05-29 NOTE — LETTER
May 29, 2018      Maude Santiago  [address] [place] [address]      Dear Maude Santiago,      At [facility] we care about your health and are committed to providing quality patient care, which includes staying current on preventative cancer screenings.  You can increase your chances of finding and treating cancers through regular screenings.      Our records show that you are due for the following screening(s):    Colonoscopy for colon cancer  Specialty Schedule Number [phone]  Recommended every ten years for everyone age 50 and older  We strongly urge our patient's to consider having a colonoscopy done, which is the best screening test available and only needs to be done every 10 years if normal.      Other option is that you can do a FIT and this is once a year.  Any questions or concern, please contact us at [phone].      You may contact the closest location to schedule the screening test(s) at your earliest convenience.    If you have already had one or all of the above screening tests at another facility, please call us so that we may update your chart.      Sincerely,         Jessica Calderon MD/ NS  [facility]

## 2018-05-30 NOTE — PATIENT INSTRUCTIONS
Based on your medical history and these are the current health maintenance or preventive care services that you are due for (some may have been done at this visit)  Health Maintenance Due   Topic Date Due     TETANUS IMMUNIZATION (SYSTEM ASSIGNED)  07/28/1966     HEPATITIS C SCREENING  07/28/1966     LIPID SCREEN Q5 YR FEMALE (SYSTEM ASSIGNED)  07/28/1993     ADVANCE DIRECTIVE PLANNING Q5 YRS  07/28/2003     DEXA SCAN SCREENING (SYSTEM ASSIGNED)  07/28/2013     PNEUMOCOCCAL (1 of 2 - PCV13) 07/28/2013         At [facility], we strive to deliver an exceptional experience to you, every time we see you.    If you receive a survey in the mail, please send us back your thoughts. We really do value your feedback.    Your care team's suggested websites for health information:  Www.[employer].TrustDegrees : Up to date and easily searchable information on multiple topics.  Www.[employer].gov : medication info, interactive tutorials, watch real surgeries online  Www.familydoctor.org : good info from the Academy of Family Physicians  Www.cdc.gov : public health info, travel advisories, epidemics (H1N1)  Www.aap.org : children's health info, normal development, vaccinations  Www.health.Counts include 234 beds at the [facility].[place].us : [place] dept of health, public health issues in [place], N1N1    How to contact your care team:   Team Karyn/Spirit [phone]         Pharmacy [phone]    Dr. Durbin, Vivien Flores PA-C, Dr. Calderon, Lakshmi MURRELL CNP, Estefania Joy PA-C, Dr. Barrera, and HANDY Guajardo CNP    Team RNs: Emely & Diana      Clinic hours  M-Th 7 am-7 pm   Fri 7 am-5 pm.   Urgent care M-F 11 am-9 pm,   Sat/Sun 9 am-5 pm.  Pharmacy M-Th 8 am-8 pm Fri 8 am-6 pm  Sat/Sun 9 am-5 pm.     All password changes, disabled accounts, or ID changes in Netbookst/MyHealth will be done by our Access Services Department.    If you need help with your account or password, call: [phone]. Clinic staff no longer has the ability to  change passwords.      no